# Patient Record
Sex: MALE | Race: WHITE | URBAN - METROPOLITAN AREA
[De-identification: names, ages, dates, MRNs, and addresses within clinical notes are randomized per-mention and may not be internally consistent; named-entity substitution may affect disease eponyms.]

---

## 2018-04-30 ENCOUNTER — RADIANT APPOINTMENT (OUTPATIENT)
Dept: GENERAL RADIOLOGY | Facility: CLINIC | Age: 43
End: 2018-04-30
Attending: FAMILY MEDICINE

## 2018-04-30 ENCOUNTER — OFFICE VISIT (OUTPATIENT)
Dept: URGENT CARE | Facility: URGENT CARE | Age: 43
End: 2018-04-30

## 2018-04-30 VITALS
TEMPERATURE: 97.9 F | DIASTOLIC BLOOD PRESSURE: 82 MMHG | OXYGEN SATURATION: 95 % | WEIGHT: 195.19 LBS | HEART RATE: 71 BPM | SYSTOLIC BLOOD PRESSURE: 122 MMHG

## 2018-04-30 DIAGNOSIS — S99.922A TOE INJURY, LEFT, INITIAL ENCOUNTER: ICD-10-CM

## 2018-04-30 DIAGNOSIS — S92.425A CLOSED NONDISPLACED FRACTURE OF DISTAL PHALANX OF LEFT GREAT TOE, INITIAL ENCOUNTER: Primary | ICD-10-CM

## 2018-04-30 PROCEDURE — 99203 OFFICE O/P NEW LOW 30 MIN: CPT | Performed by: FAMILY MEDICINE

## 2018-04-30 PROCEDURE — 73660 X-RAY EXAM OF TOE(S): CPT | Mod: LT

## 2018-04-30 ASSESSMENT — PAIN SCALES - GENERAL: PAINLEVEL: EXTREME PAIN (8)

## 2018-04-30 NOTE — MR AVS SNAPSHOT
"              After Visit Summary   2018    Simone Sanchez    MRN: [id number]           Patient Information     Date Of Birth          1975        Visit Information        Provider Department      2018 6:35 PM Antonio Mcbride DO [facility]        Today's Diagnoses     Closed nondisplaced fracture of distal phalanx of left great toe, initial encounter    -  1    Toe injury, left, initial encounter           Follow-ups after your visit        Who to contact     If you have questions or need follow up information about today's clinic visit or your schedule please contact [facility] directly at [phone].  Normal or non-critical lab and imaging results will be communicated to you by MyChart, letter or phone within 4 business days after the clinic has received the results. If you do not hear from us within 7 days, please contact the clinic through MyChart or phone. If you have a critical or abnormal lab result, we will notify you by phone as soon as possible.  Submit refill requests through [employer] or call your pharmacy and they will forward the refill request to us. Please allow 3 business days for your refill to be completed.          Additional Information About Your Visit        MyChart Information     [employer] lets you send messages to your doctor, view your test results, renew your prescriptions, schedule appointments and more. To sign up, go to [email or web address] . Click on \"Log in\" on the left side of the screen, which will take you to the Welcome page. Then click on \"Sign up Now\" on the right side of the page.     You will be asked to enter the access code listed below, as well as some personal information. Please follow the directions to create your username and password.     Your access code is: [email or web address]  Expires: 2018  7:47 PM     Your access code will  in 90 days. If you need help or a new code, please call your " [facility] or [phone].        Care EveryWhere ID     This is your Care EveryWhere ID. This could be used by other organizations to access your [place] medical records  [id number]        Your Vitals Were     Pulse Temperature Pulse Oximetry             71 97.9  F (36.6  C) (Oral) 95%          Blood Pressure from Last 3 Encounters:   04/30/18 122/82    Weight from Last 3 Encounters:   04/30/18 195 lb 3 oz (88.5 kg)              Today, you had the following     No orders found for display       Primary Care Provider Fax #    Physician No Ref-Primary [phone]       No address on file        Equal Access to Services     [facility]: Hadii aad darshana hadasho Soindiraali, waaxda luqadaha, qaybta kaalmada adecarmeloyada, jennie villa adecarmelo maldonado . So [facility] [phone].    ATENCIÓN: Si habla español, tiene a fox disposición servicios gratuitos de asistencia lingüística. Llame al [phone].    We comply with applicable federal civil rights laws and [place] laws. We do not discriminate on the basis of race, color, national origin, age, disability, sex, sexual orientation, or gender identity.            Thank you!     Thank you for choosing [facility]  for your care. Our goal is always to provide you with excellent care. Hearing back from our patients is one way we can continue to improve our services. Please take a few minutes to complete the written survey that you may receive in the mail after your visit with us. Thank you!             Your Updated Medication List - Protect others around you: Learn how to safely use, store and throw away your medicines at www.disposemymeds.org.      Notice  As of 4/30/2018  7:47 PM    You have not been prescribed any medications.

## 2018-05-01 NOTE — PROGRESS NOTES
SUBJECTIVE:  Chief Complaint   Patient presents with     Pain     Pt dropped a board on toe at work. Left foot big toe swollen and discolored.     .ident presents with a chief complaint of left toe(s) great.  The injury occurred 10 hours ago.   The injury happened while at work.   How: trauma:  immediate pain  The patient complained of moderate pain and has had decreased ROM.    Pain exacerbated by weight-bearing and movement    He treated it initially with no therapy.   This is the first time this type of injury has occurred to this patient.     No past medical history on file.  Not on File  Social History   Substance Use Topics     Smoking status: Not on file     Smokeless tobacco: Not on file     Alcohol use Not on file       ROSINTEGUMENTARY/SKIN: NEGATIVE for open wound/bleeding and NEGATIVE for bruising    EXAM: /82  Pulse 71  Temp 97.9  F (36.6  C) (Oral)  Wt 195 lb 3 oz (88.5 kg)  SpO2 95%Gen: healthy,alert,no distress  Extremity: toe has pain with palpation and rom.   There is not compromise to the distal circulation.  Pulses are +2 and CRT is brisk.GENERAL APPEARANCE: healthy, alert and no distress  EXTREMITIES: peripheral pulses normal  SKIN: no suspicious lesions or rashes  NEURO: Normal strength and tone, sensory exam grossly normal, mentation intact and speech normal    X-RAY with avulsion fx, read by Dr. Antonio Mcbride      ICD-10-CM    1. Closed nondisplaced fracture of distal phalanx of left great toe, initial encounter S92.425A    2. Toe injury, left, initial encounter S99.922A CANCELED: XR Toe Left G/E 2 Views     Hard sole shoe  Ibuprofen  RICE

## 2022-02-17 NOTE — LETTER
.  Refill Request     Last Seen: Last Seen Department: 2/10/2022  Last Seen by PCP: 2/10/2022    Last Written: 8-16-21 60 with 5     Next Appointment:   Future Appointments   Date Time Provider Roxie Love   3/28/2022  3:30 PM DEMARIO Thorne   9/23/2022  1:00 PM DEMARIO Thorne  B[facility]       Future appointment scheduled      Requested Prescriptions     Pending Prescriptions Disp Refills    omeprazole (PRILOSEC) 20 MG delayed release capsule [Pharmacy Med Name: Omeprazole 20 MG Oral Capsule Delayed Release] 60 capsule 0     Sig: TAKE 1 CAPSULE BY MOUTH TWICE DAILY WITH MEALS [address] [address]  [phone]        2018    REPORT OF WORK ABILITY    PATIENT DATA  Employee Name: Simone Sanchez        : 1975   (Not on file)  Work related injury: YES  Today's date: 2018  Date of injury: 2018     PROVIDER EVALUATION: Please fill in as needed.  Please give copy to employee for employer.  1. Diagnosis: Fracture great toe  2. Treatment: ice/hard sole shoe  3. Medication: advil  NOTE: When ordering a medication, [place] Rules require Work Comp or WC on prescriptions.  4. Return to work date: 18 with the following: * Other: activity as tolerated for rest week      RESTRICTIONS: Unlimited unless listed.  Restrictions apply to home and leisure also.  If work within restrictions is not available, the employee is totally disabled.  Provider comments: none  Medical Examiner: Antonio Mcbride      License or registration: [id number]    Next appointment: As needed    CC: Employer, Managed Care Plan/Payor, Patient